# Patient Record
Sex: MALE | Race: OTHER | NOT HISPANIC OR LATINO | URBAN - METROPOLITAN AREA
[De-identification: names, ages, dates, MRNs, and addresses within clinical notes are randomized per-mention and may not be internally consistent; named-entity substitution may affect disease eponyms.]

---

## 2020-08-24 ENCOUNTER — EMERGENCY (EMERGENCY)
Facility: HOSPITAL | Age: 35
LOS: 0 days | Discharge: HOME | End: 2020-08-24
Attending: EMERGENCY MEDICINE
Payer: COMMERCIAL

## 2020-08-24 VITALS
DIASTOLIC BLOOD PRESSURE: 84 MMHG | TEMPERATURE: 98 F | RESPIRATION RATE: 18 BRPM | HEART RATE: 128 BPM | OXYGEN SATURATION: 96 % | SYSTOLIC BLOOD PRESSURE: 143 MMHG

## 2020-08-24 VITALS
HEART RATE: 146 BPM | WEIGHT: 180.78 LBS | RESPIRATION RATE: 18 BRPM | OXYGEN SATURATION: 96 % | HEIGHT: 66.93 IN | DIASTOLIC BLOOD PRESSURE: 71 MMHG | TEMPERATURE: 101 F | SYSTOLIC BLOOD PRESSURE: 110 MMHG

## 2020-08-24 DIAGNOSIS — J02.9 ACUTE PHARYNGITIS, UNSPECIFIED: ICD-10-CM

## 2020-08-24 DIAGNOSIS — R50.9 FEVER, UNSPECIFIED: ICD-10-CM

## 2020-08-24 DIAGNOSIS — R00.0 TACHYCARDIA, UNSPECIFIED: ICD-10-CM

## 2020-08-24 LAB — S PYO AG SPEC QL IA: POSITIVE

## 2020-08-24 PROCEDURE — 99283 EMERGENCY DEPT VISIT LOW MDM: CPT

## 2020-08-24 PROCEDURE — 87880 STREP A ASSAY W/OPTIC: CPT

## 2020-08-24 PROCEDURE — U0003: CPT

## 2020-08-24 NOTE — ED STATDOCS - OBJECTIVE STATEMENT
34 y/o male visiting from [place], with no PMHx, presents for COVID-19 testing. Pt not feeling well today. +sore throat. +fever of 100.7F. No sick contacts. Family wanted pt to get tested. Non smoker. No illegal drug use.

## 2020-08-24 NOTE — ED STATDOCS - CARDIAC, MLM
normal rate, regular rhythm, and no murmur. normal rate, regular rhythm, and no murmur.  Mild tachycardic

## 2020-08-24 NOTE — ED STATDOCS - CLINICAL SUMMARY MEDICAL DECISION MAKING FREE TEXT BOX
pt found to be tachy, offered pt ekg labs and IV hydration. Pt feels fine and refuses further testing. Discussed risks/ benefits. Only wants COVID test. Given fevers and redness of throat and several lymphadenopathy, will rx for strep test, does not want rapid strep in the ed today pt found to be tachy, offered pt ekg labs and IV hydration. Pt HR ~110 during exam. Pt feels fine and refuses further testing. Discussed risks/ benefits. Only wants COVID test. Given fevers and redness of throat and several lymphadenopathy, will rx for strep test, does not want rapid strep in the ed today

## 2020-08-24 NOTE — ED ADULT TRIAGE NOTE - CHIEF COMPLAINT QUOTE
Patient comes in with throat pain and fever. Patient pulled in from car swab line. Patient comes in with throat pain and fever. Patient pulled in from car swab line. Patient is coming from [facility].

## 2020-08-24 NOTE — ED ADULT NURSE NOTE - CHIEF COMPLAINT QUOTE
Patient comes in with throat pain and fever. Patient pulled in from car swab line. Patient is coming from [facility].

## 2020-08-24 NOTE — ED STATDOCS - NSFOLLOWUPINSTRUCTIONS_ED_ALL_ED_FT
Please call and follow up with your doctor in 1-3 days.  Use Tylenol (acetaminophen) 1000mg every 6 hours and Motrin (Ibuprofen) 600 mg every 6 hours as need for fever/pain.    please drink lots of fluid.    Pharyngitis    WHAT YOU NEED TO KNOW:    Pharyngitis, or sore throat, is inflammation of the tissues and structures in your pharynx (throat). Pharyngitis is most often caused by bacteria. It may also be caused by a cold or flu virus. Other causes include smoking, allergies, or acid reflux.     DISCHARGE INSTRUCTIONS:    Call 911 for any of the following:     You have trouble breathing or swallowing because your throat is swollen or sore.        Return to the emergency department if:     You are drooling because it hurts too much to swallow.      Your fever is higher than 102°F (39°C) or lasts longer than 3 days.      You are confused.      You taste blood in your throat.    Contact your healthcare provider if:     Your throat pain gets worse.      You have a painful lump in your throat that does not go away after 5 days.      Your symptoms do not improve after 5 days.      You have questions or concerns about your condition or care.    Medicines: Viral pharyngitis will go away on its own without treatment. Your sore throat should start to feel better in 3 to 5 days for both viral and bacterial infections. You may need any of the following:     Antibiotics treat a bacterial infection.      NSAIDs, such as ibuprofen, help decrease swelling, pain, and fever. NSAIDs can cause stomach bleeding or kidney problems in certain people. If you take blood thinner medicine, always ask your healthcare provider if NSAIDs are safe for you. Always read the medicine label and follow directions.      Acetaminophen decreases pain and fever. It is available without a doctor's order. Ask how much to take and how often to take it. Follow directions. Acetaminophen can cause liver damage if not taken correctly.      Take your medicine as directed. Contact your healthcare provider if you think your medicine is not helping or if you have side effects. Tell him or her if you are allergic to any medicine. Keep a list of the medicines, vitamins, and herbs you take. Include the amounts, and when and why you take them. Bring the list or the pill bottles to follow-up visits. Carry your medicine list with you in case of an emergency.    Manage your symptoms:     Gargle salt water. Mix ¼ teaspoon salt in an 8 ounce glass of warm water and gargle. This may help decrease swelling in your throat.      Drink liquids as directed. You may need to drink more liquids than usual. Liquids may help soothe your throat and prevent dehydration. Ask how much liquid to drink each day and which liquids are best for you.      Use a cool-steam humidifier to help moisten the air in your room and calm your cough.      Soothe your throat with cough drops, ice, soft foods, or popsicles.    Prevent the spread of pharyngitis: Cover your mouth and nose when you cough or sneeze. Do not share food or drinks. Wash your hands often. Use soap and water. If soap and water are unavailable, use an alcohol based hand .     Follow up with your healthcare provider as directed: Write down your questions so you remember to ask them during your visits.    Viral Respiratory Infection  A viral respiratory infection is an illness that affects parts of the body used for breathing, like the lungs, nose, and throat. It is caused by a germ called a virus.    ImageSome examples of this kind of infection are:    A cold.  The flu (influenza).  A respiratory syncytial virus (RSV) infection.    How do I know if I have this infection?  Most of the time this infection causes:    A stuffy or runny nose.  Yellow or green fluid in the nose.  A cough.  Sneezing.  Tiredness (fatigue).  Achy muscles.  A sore throat.  Sweating or chills.  A fever.  A headache.    How is this infection treated?  If the flu is diagnosed early, it may be treated with an antiviral medicine. This medicine shortens the length of time a person has symptoms. Symptoms may be treated with over-the-counter and prescription medicines, such as:    Expectorants. These make it easier to cough up mucus.  Decongestant nasal sprays.    Doctors do not prescribe antibiotic medicines for viral infections. They do not work with this kind of infection.    How do I know if I should stay home?  To keep others from getting sick, stay home if you have:    A fever.  A lasting cough.  A sore throat.  A runny nose.  Sneezing.  Muscles aches.  Headaches.  Tiredness.  Weakness.  Chills.  Sweating.  An upset stomach (nausea).    Follow these instructions at home:  Rest as much as possible.  Take over-the-counter and prescription medicines only as told by your doctor.  Drink enough fluid to keep your pee (urine) clear or pale yellow.  Gargle with salt water. Do this 3–4 times per day or as needed. To make a salt–water mixture, dissolve ½–1 tsp of salt in 1 cup of warm water. Make sure the salt dissolves all the way.  Use nose drops made from salt water. This helps with stuffiness (congestion). It also helps soften the skin around your nose.  Do not drink alcohol.  Do not use tobacco products, including cigarettes, chewing tobacco, and e-cigarettes. If you need help quitting, ask your doctor.  Get help if:  Your symptoms last for 10 days or longer.  Your symptoms get worse over time.  You have a fever.  You have very bad pain in your face or forehead.  Parts of your jaw or neck become very swollen.  Get help right away if:  You feel pain or pressure in your chest.  You have shortness of breath.  You faint or feel like you will faint.  You keep throwing up (vomiting).  You feel confused.  This information is not intended to replace advice given to you by your health care provider. Make sure you discuss any questions you have with your health care provider.

## 2020-08-24 NOTE — ED STATDOCS - PATIENT PORTAL LINK FT
You can access the FollowMyHealth Patient Portal offered by [facility] by registering at the following website: [email or web address]. By joining [employer]’s FollowMyHealth portal, you will also be able to view your health information using other applications (apps) compatible with our system.

## 2020-08-25 LAB — SARS-COV-2 RNA SPEC QL NAA+PROBE: SIGNIFICANT CHANGE UP

## 2020-08-25 NOTE — ED POST DISCHARGE NOTE - RESULT SUMMARY
+strep, patient states he is on amoxil 500mg TID x7 days already.  USed number from COVID order to call back at [phone] -Angela Webb PA-C

## 2024-03-05 PROBLEM — Z78.9 OTHER SPECIFIED HEALTH STATUS: Chronic | Status: ACTIVE | Noted: 2020-08-24

## 2024-03-13 PROBLEM — Z00.00 ENCOUNTER FOR PREVENTIVE HEALTH EXAMINATION: Status: ACTIVE | Noted: 2024-03-13

## 2024-03-19 ENCOUNTER — APPOINTMENT (OUTPATIENT)
Dept: UROLOGY | Facility: CLINIC | Age: 39
End: 2024-03-19
Payer: COMMERCIAL

## 2024-03-19 VITALS
BODY MASS INDEX: 28.37 KG/M2 | DIASTOLIC BLOOD PRESSURE: 78 MMHG | HEART RATE: 71 BPM | HEIGHT: 66.93 IN | OXYGEN SATURATION: 96 % | WEIGHT: 180.78 LBS | SYSTOLIC BLOOD PRESSURE: 129 MMHG

## 2024-03-19 DIAGNOSIS — Z78.9 OTHER SPECIFIED HEALTH STATUS: ICD-10-CM

## 2024-03-19 DIAGNOSIS — Z30.09 ENCOUNTER FOR OTHER GENERAL COUNSELING AND ADVICE ON CONTRACEPTION: ICD-10-CM

## 2024-03-19 PROCEDURE — 99203 OFFICE O/P NEW LOW 30 MIN: CPT

## 2024-03-19 NOTE — HISTORY OF PRESENT ILLNESS
[FreeTextEntry1] : 38-year-old male presents for vasectomy evaluation. Patient mentions that him and his wife: 32-year-old have 2 children, youngest 4 years old.  They have explored other options of contraception and have decided for him to undergo Vasectomy. Denies any difficulty urinating.  Denies dysuria, hematuria, flank pain, fever, chills or rigors. Normal erections and libido.  No Family history of Prostate cancer.

## 2024-03-19 NOTE — ASSESSMENT
[FreeTextEntry1] : Vasectomy: Extensively discussed the procedure and reviewed the likely postoperative course.  He understands the potential side effects of anesthesia, bleeding, scrotal hematoma, wound infection, epididymal orchitis, epididymal congestion, chronic testicular pain requiring further surgery, sperm granuloma, antisperm antibodies, early recanalization, spontaneous recanalization with pregnancy after demonstration of azoospermia and the possible association with prostate cancer.  He is aware of the recommendation for PSA and JESSICA yearly after the age of 50.  He will need to have a semen analysis after 6-8 weeks. Discussed the need to continue to use contraception until he is notified by us of his sterility.   Recommended to undergo the procedure under general anesthesia.  Patient agreeable. Will schedule next available at [facility].

## 2024-03-19 NOTE — PHYSICAL EXAM
[Urethral Meatus] : meatus normal [Scrotum] : the scrotum was normal [Epididymis] : the epididymides were normal [Testes Mass (___cm)] : there were no testicular masses [Testes Tenderness] : no tenderness of the testes [de-identified] : contracted scrotum

## 2024-05-02 ENCOUNTER — APPOINTMENT (OUTPATIENT)
Dept: UROLOGY | Facility: CLINIC | Age: 39
End: 2024-05-02

## 2024-05-10 ENCOUNTER — OUTPATIENT (OUTPATIENT)
Dept: OUTPATIENT SERVICES | Facility: HOSPITAL | Age: 39
LOS: 1 days | End: 2024-05-10
Payer: COMMERCIAL

## 2024-05-10 VITALS
DIASTOLIC BLOOD PRESSURE: 73 MMHG | SYSTOLIC BLOOD PRESSURE: 115 MMHG | RESPIRATION RATE: 16 BRPM | TEMPERATURE: 98 F | WEIGHT: 179.02 LBS | HEIGHT: 66 IN | HEART RATE: 77 BPM | OXYGEN SATURATION: 99 %

## 2024-05-10 DIAGNOSIS — Z98.890 OTHER SPECIFIED POSTPROCEDURAL STATES: Chronic | ICD-10-CM

## 2024-05-10 DIAGNOSIS — Z01.818 ENCOUNTER FOR OTHER PREPROCEDURAL EXAMINATION: ICD-10-CM

## 2024-05-10 LAB
ABO RH CONFIRMATION: SIGNIFICANT CHANGE UP
ANION GAP SERPL CALC-SCNC: 2 MMOL/L — LOW (ref 5–17)
APPEARANCE UR: CLEAR — SIGNIFICANT CHANGE UP
APTT BLD: 30.5 SEC — SIGNIFICANT CHANGE UP (ref 24.5–35.6)
BACTERIA # UR AUTO: NEGATIVE /HPF — SIGNIFICANT CHANGE UP
BASOPHILS # BLD AUTO: 0.05 K/UL — SIGNIFICANT CHANGE UP (ref 0–0.2)
BASOPHILS NFR BLD AUTO: 0.7 % — SIGNIFICANT CHANGE UP (ref 0–2)
BILIRUB UR-MCNC: NEGATIVE — SIGNIFICANT CHANGE UP
BLD GP AB SCN SERPL QL: SIGNIFICANT CHANGE UP
BUN SERPL-MCNC: 24 MG/DL — HIGH (ref 7–23)
CALCIUM SERPL-MCNC: 8.7 MG/DL — SIGNIFICANT CHANGE UP (ref 8.5–10.1)
CAST: 1 /LPF — SIGNIFICANT CHANGE UP (ref 0–4)
CHLORIDE SERPL-SCNC: 108 MMOL/L — SIGNIFICANT CHANGE UP (ref 96–108)
CO2 SERPL-SCNC: 28 MMOL/L — SIGNIFICANT CHANGE UP (ref 22–31)
COLOR SPEC: YELLOW — SIGNIFICANT CHANGE UP
CREAT SERPL-MCNC: 1.16 MG/DL — SIGNIFICANT CHANGE UP (ref 0.5–1.3)
DIFF PNL FLD: NEGATIVE — SIGNIFICANT CHANGE UP
EGFR: 82 ML/MIN/1.73M2 — SIGNIFICANT CHANGE UP
EOSINOPHIL # BLD AUTO: 0.22 K/UL — SIGNIFICANT CHANGE UP (ref 0–0.5)
EOSINOPHIL NFR BLD AUTO: 3.1 % — SIGNIFICANT CHANGE UP (ref 0–6)
GLUCOSE SERPL-MCNC: 108 MG/DL — HIGH (ref 70–99)
GLUCOSE UR QL: NEGATIVE MG/DL — SIGNIFICANT CHANGE UP
HCT VFR BLD CALC: 40.7 % — SIGNIFICANT CHANGE UP (ref 39–50)
HGB BLD-MCNC: 13.6 G/DL — SIGNIFICANT CHANGE UP (ref 13–17)
IMM GRANULOCYTES NFR BLD AUTO: 0.6 % — SIGNIFICANT CHANGE UP (ref 0–0.9)
INR BLD: 0.94 RATIO — SIGNIFICANT CHANGE UP (ref 0.85–1.18)
KETONES UR-MCNC: NEGATIVE MG/DL — SIGNIFICANT CHANGE UP
LEUKOCYTE ESTERASE UR-ACNC: ABNORMAL
LYMPHOCYTES # BLD AUTO: 1.82 K/UL — SIGNIFICANT CHANGE UP (ref 1–3.3)
LYMPHOCYTES # BLD AUTO: 25.3 % — SIGNIFICANT CHANGE UP (ref 13–44)
MCHC RBC-ENTMCNC: 30 PG — SIGNIFICANT CHANGE UP (ref 27–34)
MCHC RBC-ENTMCNC: 33.4 GM/DL — SIGNIFICANT CHANGE UP (ref 32–36)
MCV RBC AUTO: 89.6 FL — SIGNIFICANT CHANGE UP (ref 80–100)
MONOCYTES # BLD AUTO: 0.45 K/UL — SIGNIFICANT CHANGE UP (ref 0–0.9)
MONOCYTES NFR BLD AUTO: 6.3 % — SIGNIFICANT CHANGE UP (ref 2–14)
NEUTROPHILS # BLD AUTO: 4.6 K/UL — SIGNIFICANT CHANGE UP (ref 1.8–7.4)
NEUTROPHILS NFR BLD AUTO: 64 % — SIGNIFICANT CHANGE UP (ref 43–77)
NITRITE UR-MCNC: NEGATIVE — SIGNIFICANT CHANGE UP
PH UR: 5.5 — SIGNIFICANT CHANGE UP (ref 5–8)
PLATELET # BLD AUTO: 237 K/UL — SIGNIFICANT CHANGE UP (ref 150–400)
POTASSIUM SERPL-MCNC: 3.8 MMOL/L — SIGNIFICANT CHANGE UP (ref 3.5–5.3)
POTASSIUM SERPL-SCNC: 3.8 MMOL/L — SIGNIFICANT CHANGE UP (ref 3.5–5.3)
PROT UR-MCNC: NEGATIVE MG/DL — SIGNIFICANT CHANGE UP
PROTHROM AB SERPL-ACNC: 10.6 SEC — SIGNIFICANT CHANGE UP (ref 9.5–13)
RBC # BLD: 4.54 M/UL — SIGNIFICANT CHANGE UP (ref 4.2–5.8)
RBC # FLD: 12 % — SIGNIFICANT CHANGE UP (ref 10.3–14.5)
RBC CASTS # UR COMP ASSIST: 0 /HPF — SIGNIFICANT CHANGE UP (ref 0–4)
SODIUM SERPL-SCNC: 138 MMOL/L — SIGNIFICANT CHANGE UP (ref 135–145)
SP GR SPEC: 1.03 — SIGNIFICANT CHANGE UP (ref 1–1.03)
SQUAMOUS # UR AUTO: 0 /HPF — SIGNIFICANT CHANGE UP (ref 0–5)
UROBILINOGEN FLD QL: 0.2 MG/DL — SIGNIFICANT CHANGE UP (ref 0.2–1)
WBC # BLD: 7.18 K/UL — SIGNIFICANT CHANGE UP (ref 3.8–10.5)
WBC # FLD AUTO: 7.18 K/UL — SIGNIFICANT CHANGE UP (ref 3.8–10.5)
WBC UR QL: 1 /HPF — SIGNIFICANT CHANGE UP (ref 0–5)

## 2024-05-10 PROCEDURE — 85730 THROMBOPLASTIN TIME PARTIAL: CPT

## 2024-05-10 PROCEDURE — 36415 COLL VENOUS BLD VENIPUNCTURE: CPT

## 2024-05-10 PROCEDURE — 93005 ELECTROCARDIOGRAM TRACING: CPT

## 2024-05-10 PROCEDURE — 86901 BLOOD TYPING SEROLOGIC RH(D): CPT

## 2024-05-10 PROCEDURE — 81001 URINALYSIS AUTO W/SCOPE: CPT

## 2024-05-10 PROCEDURE — 85610 PROTHROMBIN TIME: CPT

## 2024-05-10 PROCEDURE — 86850 RBC ANTIBODY SCREEN: CPT

## 2024-05-10 PROCEDURE — 86900 BLOOD TYPING SEROLOGIC ABO: CPT

## 2024-05-10 PROCEDURE — 87086 URINE CULTURE/COLONY COUNT: CPT

## 2024-05-10 PROCEDURE — 99214 OFFICE O/P EST MOD 30 MIN: CPT | Mod: 25

## 2024-05-10 PROCEDURE — 93010 ELECTROCARDIOGRAM REPORT: CPT

## 2024-05-10 PROCEDURE — 85025 COMPLETE CBC W/AUTO DIFF WBC: CPT

## 2024-05-10 PROCEDURE — 80048 BASIC METABOLIC PNL TOTAL CA: CPT

## 2024-05-10 NOTE — H&P PST ADULT - ASSESSMENT
38 year old male with desire for sterilization; he presents to PST for planned bilateral vasectomy       Plan:  1. PST instructions given ; NPO status/  instructions to be given by ASU   2. Pt instructed to take following meds on day of surgery: none   3. Pt instructed to take routine evening medications unless indicated   4. Stop NSAIDS ( Aspirin Alev Motrin Mobic Diclofenac), herbal supplements , MVI , Vitamin fish oil 7 days prior to surgery  unless   directed by surgeon or cardiologist;   5. Medical Optimization not indicated   6. Pt denies covid symptoms shortness of breath fever cough   7. Labs EKG  as per surgeon request

## 2024-05-11 DIAGNOSIS — Z01.818 ENCOUNTER FOR OTHER PREPROCEDURAL EXAMINATION: ICD-10-CM

## 2024-05-11 LAB
CULTURE RESULTS: NO GROWTH — SIGNIFICANT CHANGE UP
SPECIMEN SOURCE: SIGNIFICANT CHANGE UP

## 2024-05-20 ENCOUNTER — APPOINTMENT (OUTPATIENT)
Dept: UROLOGY | Facility: HOSPITAL | Age: 39
End: 2024-05-20

## 2024-05-20 ENCOUNTER — TRANSCRIPTION ENCOUNTER (OUTPATIENT)
Age: 39
End: 2024-05-20

## 2024-05-20 ENCOUNTER — RESULT REVIEW (OUTPATIENT)
Age: 39
End: 2024-05-20

## 2024-05-20 ENCOUNTER — OUTPATIENT (OUTPATIENT)
Dept: INPATIENT UNIT | Facility: HOSPITAL | Age: 39
LOS: 1 days | Discharge: HOME | End: 2024-05-20
Payer: COMMERCIAL

## 2024-05-20 VITALS
HEART RATE: 79 BPM | OXYGEN SATURATION: 100 % | RESPIRATION RATE: 18 BRPM | SYSTOLIC BLOOD PRESSURE: 109 MMHG | DIASTOLIC BLOOD PRESSURE: 65 MMHG | TEMPERATURE: 98 F

## 2024-05-20 VITALS
HEART RATE: 78 BPM | TEMPERATURE: 98 F | OXYGEN SATURATION: 100 % | RESPIRATION RATE: 16 BRPM | HEIGHT: 66.93 IN | DIASTOLIC BLOOD PRESSURE: 74 MMHG | SYSTOLIC BLOOD PRESSURE: 114 MMHG | WEIGHT: 176.37 LBS

## 2024-05-20 DIAGNOSIS — Z30.09 ENCOUNTER FOR OTHER GENERAL COUNSELING AND ADVICE ON CONTRACEPTION: ICD-10-CM

## 2024-05-20 DIAGNOSIS — Z98.890 OTHER SPECIFIED POSTPROCEDURAL STATES: Chronic | ICD-10-CM

## 2024-05-20 PROCEDURE — 55250 REMOVAL OF SPERM DUCT(S): CPT

## 2024-05-20 PROCEDURE — 88302 TISSUE EXAM BY PATHOLOGIST: CPT | Mod: 26

## 2024-05-20 PROCEDURE — 88302 TISSUE EXAM BY PATHOLOGIST: CPT

## 2024-05-20 RX ORDER — IBUPROFEN 200 MG
1 TABLET ORAL
Qty: 15 | Refills: 1
Start: 2024-05-20 | End: 2024-05-29

## 2024-05-20 RX ORDER — SODIUM CHLORIDE 9 MG/ML
1000 INJECTION, SOLUTION INTRAVENOUS
Refills: 0 | Status: DISCONTINUED | OUTPATIENT
Start: 2024-05-20 | End: 2024-05-20

## 2024-05-20 RX ORDER — ONDANSETRON 8 MG/1
4 TABLET, FILM COATED ORAL ONCE
Refills: 0 | Status: DISCONTINUED | OUTPATIENT
Start: 2024-05-20 | End: 2024-05-20

## 2024-05-20 RX ORDER — FENTANYL CITRATE 50 UG/ML
25 INJECTION INTRAVENOUS
Refills: 0 | Status: DISCONTINUED | OUTPATIENT
Start: 2024-05-20 | End: 2024-05-20

## 2024-05-20 RX ORDER — OXYCODONE AND ACETAMINOPHEN 5; 325 MG/1; MG/1
1 TABLET ORAL
Qty: 16 | Refills: 0
Start: 2024-05-20 | End: 2024-05-23

## 2024-05-20 RX ORDER — OXYCODONE HYDROCHLORIDE 5 MG/1
5 TABLET ORAL ONCE
Refills: 0 | Status: DISCONTINUED | OUTPATIENT
Start: 2024-05-20 | End: 2024-05-20

## 2024-05-20 NOTE — ASU DISCHARGE PLAN (ADULT/PEDIATRIC) - CARE PROVIDER_API CALL
Seb Davis  [address] [place] [address]  [address] [place] [address]    Tel: [phone]  Phone: (   )    -  Fax: (   )    -  Scheduled Appointment: 06/05/2024 02:20 PM

## 2024-05-20 NOTE — ASU PATIENT PROFILE, ADULT - FALL HARM RISK - UNIVERSAL INTERVENTIONS
Bed in lowest position, wheels locked, appropriate side rails in place/Call bell, personal items and telephone in reach/Instruct patient to call for assistance before getting out of bed or chair/Non-slip footwear when patient is out of bed/[place] to call system/Physically safe environment - no spills, clutter or unnecessary equipment/Purposeful Proactive Rounding

## 2024-05-20 NOTE — ASU PATIENT PROFILE, ADULT - PRO INTERPRETER NEED 2
PT C/O INTERMITTENT RIGHT UPPER CHEST PAIN. MADE DR GARCIA AWARE AND SHE ORDERED NITROSTAT 
FOR PT. ADMINISTERED NITROSTAT AND PT TOLERATED WELL. English

## 2024-05-20 NOTE — ASU DISCHARGE PLAN (ADULT/PEDIATRIC) - PROVIDER TOKENS
FREE:[LAST:[Susan],FIRST:[Seb],PHONE:[(   )    -],FAX:[(   )    -],ADDRESS:[[address] [place] [address]    Tel: [phone]],SCHEDULEDAPPT:[06/05/2024],SCHEDULEDAPPTTIME:[02:20 PM]]

## 2024-05-20 NOTE — ASU DISCHARGE PLAN (ADULT/PEDIATRIC) - CALL YOUR DOCTOR IF YOU HAVE ANY OF THE FOLLOWING:
Bleeding that does not stop/Swelling that gets worse/Fever greater than (need to indicate Fahrenheit or Celsius)/Wound/Surgical Site with redness, or foul smelling discharge or pus/Nausea and vomiting that does not stop/Inability to tolerate liquids or foods/Increased irritability or sluggishness

## 2024-05-20 NOTE — ASU DISCHARGE PLAN (ADULT/PEDIATRIC) - NS MD DC FALL RISK RISK
For information on Fall & Injury Prevention, visit: [email or web address] OR  [email or web address] OR  https://www.cdc.gov/steadi/patient.html

## 2024-05-22 LAB — SURGICAL PATHOLOGY STUDY: SIGNIFICANT CHANGE UP

## 2024-05-26 DIAGNOSIS — Z30.2 ENCOUNTER FOR STERILIZATION: ICD-10-CM

## 2024-06-05 ENCOUNTER — APPOINTMENT (OUTPATIENT)
Dept: UROLOGY | Facility: CLINIC | Age: 39
End: 2024-06-05
Payer: COMMERCIAL

## 2024-06-05 DIAGNOSIS — Z98.52 VASECTOMY STATUS: ICD-10-CM

## 2024-06-05 PROCEDURE — 99024 POSTOP FOLLOW-UP VISIT: CPT

## 2024-06-10 NOTE — ADDENDUM
[FreeTextEntry1] :  Anabella RAMOS assisted in documentation on 06/05/2024  acting as a scribe for Dr. Seb Davis.

## 2024-06-10 NOTE — ASSESSMENT
[FreeTextEntry1] : Reviewed records. Discussed labs.  Status post vasectomy: Healing well. Discussed Pathology.  Asked Pt to continue to use protection until negative semen analysis 8 weeks from procedure.  Asked Pt to call us once done, will call with results.

## 2024-06-10 NOTE — HISTORY OF PRESENT ILLNESS
[FreeTextEntry1] : 06/05/2024: 38-year-old male presents for follow-up.  Did have some pain/discomfort 1-2 days after surgery. None since then.  Did not require too much pain medication.   Status post bilateral vasectomy on 05/20/2024 at [facility].   Seen on 3/19/2024 for vasectomy evaluation. Mention that him and his wife: 32-year-old have 2 children, youngest 4 years old.  They had explored other options of contraception and have decided for him to undergo Vasectomy. Denied any difficulty urinating.  Denied dysuria, hematuria, flank pain, fever, chills or rigors. Normal erections and libido.  No Family history of Prostate cancer.

## 2024-06-10 NOTE — PHYSICAL EXAM
[Urethral Meatus] : meatus normal [Scrotum] : the scrotum was normal [Epididymis] : the epididymides were normal [Testes Tenderness] : no tenderness of the testes [Testes Mass (___cm)] : there were no testicular masses [de-identified] : Incision was well healed. Bilateral slight induration along the spermatic cord, non-tender

## 2024-06-17 ENCOUNTER — EMERGENCY (EMERGENCY)
Facility: HOSPITAL | Age: 39
LOS: 0 days | Discharge: HOME | End: 2024-06-17
Attending: HOSPITALIST
Payer: COMMERCIAL

## 2024-06-17 VITALS
DIASTOLIC BLOOD PRESSURE: 68 MMHG | TEMPERATURE: 98 F | SYSTOLIC BLOOD PRESSURE: 115 MMHG | RESPIRATION RATE: 17 BRPM | OXYGEN SATURATION: 98 % | HEART RATE: 65 BPM

## 2024-06-17 VITALS — HEIGHT: 66 IN

## 2024-06-17 DIAGNOSIS — N20.0 CALCULUS OF KIDNEY: ICD-10-CM

## 2024-06-17 DIAGNOSIS — R10.9 UNSPECIFIED ABDOMINAL PAIN: ICD-10-CM

## 2024-06-17 LAB
ALBUMIN SERPL ELPH-MCNC: 3.7 G/DL — SIGNIFICANT CHANGE UP (ref 3.3–5)
ALP SERPL-CCNC: 56 U/L — SIGNIFICANT CHANGE UP (ref 40–120)
ALT FLD-CCNC: 31 U/L — SIGNIFICANT CHANGE UP (ref 12–78)
ANION GAP SERPL CALC-SCNC: 5 MMOL/L — SIGNIFICANT CHANGE UP (ref 5–17)
APPEARANCE UR: CLEAR — SIGNIFICANT CHANGE UP
AST SERPL-CCNC: 23 U/L — SIGNIFICANT CHANGE UP (ref 15–37)
BACTERIA # UR AUTO: NEGATIVE /HPF — SIGNIFICANT CHANGE UP
BASOPHILS # BLD AUTO: 0.04 K/UL — SIGNIFICANT CHANGE UP (ref 0–0.2)
BASOPHILS NFR BLD AUTO: 0.5 % — SIGNIFICANT CHANGE UP (ref 0–2)
BILIRUB SERPL-MCNC: 0.8 MG/DL — SIGNIFICANT CHANGE UP (ref 0.2–1.2)
BILIRUB UR-MCNC: NEGATIVE — SIGNIFICANT CHANGE UP
BUN SERPL-MCNC: 20 MG/DL — SIGNIFICANT CHANGE UP (ref 7–23)
CALCIUM SERPL-MCNC: 9 MG/DL — SIGNIFICANT CHANGE UP (ref 8.5–10.1)
CAST: 1 /LPF — SIGNIFICANT CHANGE UP (ref 0–4)
CHLORIDE SERPL-SCNC: 109 MMOL/L — HIGH (ref 96–108)
CO2 SERPL-SCNC: 26 MMOL/L — SIGNIFICANT CHANGE UP (ref 22–31)
COLOR SPEC: YELLOW — SIGNIFICANT CHANGE UP
CREAT SERPL-MCNC: 1.06 MG/DL — SIGNIFICANT CHANGE UP (ref 0.5–1.3)
DIFF PNL FLD: ABNORMAL
EGFR: 90 ML/MIN/1.73M2 — SIGNIFICANT CHANGE UP
EOSINOPHIL # BLD AUTO: 0.19 K/UL — SIGNIFICANT CHANGE UP (ref 0–0.5)
EOSINOPHIL NFR BLD AUTO: 2.6 % — SIGNIFICANT CHANGE UP (ref 0–6)
GLUCOSE SERPL-MCNC: 102 MG/DL — HIGH (ref 70–99)
GLUCOSE UR QL: NEGATIVE MG/DL — SIGNIFICANT CHANGE UP
HCT VFR BLD CALC: 37.5 % — LOW (ref 39–50)
HGB BLD-MCNC: 12.9 G/DL — LOW (ref 13–17)
IMM GRANULOCYTES NFR BLD AUTO: 0.3 % — SIGNIFICANT CHANGE UP (ref 0–0.9)
KETONES UR-MCNC: NEGATIVE MG/DL — SIGNIFICANT CHANGE UP
LEUKOCYTE ESTERASE UR-ACNC: NEGATIVE — SIGNIFICANT CHANGE UP
LIDOCAIN IGE QN: 26 U/L — SIGNIFICANT CHANGE UP (ref 13–75)
LYMPHOCYTES # BLD AUTO: 2.92 K/UL — SIGNIFICANT CHANGE UP (ref 1–3.3)
LYMPHOCYTES # BLD AUTO: 39.2 % — SIGNIFICANT CHANGE UP (ref 13–44)
MCHC RBC-ENTMCNC: 30.8 PG — SIGNIFICANT CHANGE UP (ref 27–34)
MCHC RBC-ENTMCNC: 34.4 GM/DL — SIGNIFICANT CHANGE UP (ref 32–36)
MCV RBC AUTO: 89.5 FL — SIGNIFICANT CHANGE UP (ref 80–100)
MONOCYTES # BLD AUTO: 0.76 K/UL — SIGNIFICANT CHANGE UP (ref 0–0.9)
MONOCYTES NFR BLD AUTO: 10.2 % — SIGNIFICANT CHANGE UP (ref 2–14)
NEUTROPHILS # BLD AUTO: 3.52 K/UL — SIGNIFICANT CHANGE UP (ref 1.8–7.4)
NEUTROPHILS NFR BLD AUTO: 47.2 % — SIGNIFICANT CHANGE UP (ref 43–77)
NITRITE UR-MCNC: NEGATIVE — SIGNIFICANT CHANGE UP
PH UR: 5 — SIGNIFICANT CHANGE UP (ref 5–8)
PLATELET # BLD AUTO: 241 K/UL — SIGNIFICANT CHANGE UP (ref 150–400)
POTASSIUM SERPL-MCNC: 3.5 MMOL/L — SIGNIFICANT CHANGE UP (ref 3.5–5.3)
POTASSIUM SERPL-SCNC: 3.5 MMOL/L — SIGNIFICANT CHANGE UP (ref 3.5–5.3)
PROT SERPL-MCNC: 7.3 GM/DL — SIGNIFICANT CHANGE UP (ref 6–8.3)
PROT UR-MCNC: NEGATIVE MG/DL — SIGNIFICANT CHANGE UP
RBC # BLD: 4.19 M/UL — LOW (ref 4.2–5.8)
RBC # FLD: 12.4 % — SIGNIFICANT CHANGE UP (ref 10.3–14.5)
RBC CASTS # UR COMP ASSIST: 8 /HPF — HIGH (ref 0–4)
SODIUM SERPL-SCNC: 140 MMOL/L — SIGNIFICANT CHANGE UP (ref 135–145)
SP GR SPEC: 1.02 — SIGNIFICANT CHANGE UP (ref 1–1.03)
SQUAMOUS # UR AUTO: 1 /HPF — SIGNIFICANT CHANGE UP (ref 0–5)
UROBILINOGEN FLD QL: 0.2 MG/DL — SIGNIFICANT CHANGE UP (ref 0.2–1)
WBC # BLD: 7.45 K/UL — SIGNIFICANT CHANGE UP (ref 3.8–10.5)
WBC # FLD AUTO: 7.45 K/UL — SIGNIFICANT CHANGE UP (ref 3.8–10.5)
WBC UR QL: 0 /HPF — SIGNIFICANT CHANGE UP (ref 0–5)

## 2024-06-17 PROCEDURE — 96374 THER/PROPH/DIAG INJ IV PUSH: CPT

## 2024-06-17 PROCEDURE — 81001 URINALYSIS AUTO W/SCOPE: CPT

## 2024-06-17 PROCEDURE — 36415 COLL VENOUS BLD VENIPUNCTURE: CPT

## 2024-06-17 PROCEDURE — 83690 ASSAY OF LIPASE: CPT

## 2024-06-17 PROCEDURE — 74176 CT ABD & PELVIS W/O CONTRAST: CPT | Mod: 26,MC

## 2024-06-17 PROCEDURE — 93010 ELECTROCARDIOGRAM REPORT: CPT

## 2024-06-17 PROCEDURE — 80053 COMPREHEN METABOLIC PANEL: CPT

## 2024-06-17 PROCEDURE — 74176 CT ABD & PELVIS W/O CONTRAST: CPT | Mod: MC

## 2024-06-17 PROCEDURE — 93005 ELECTROCARDIOGRAM TRACING: CPT

## 2024-06-17 PROCEDURE — 99284 EMERGENCY DEPT VISIT MOD MDM: CPT

## 2024-06-17 PROCEDURE — 85025 COMPLETE CBC W/AUTO DIFF WBC: CPT

## 2024-06-17 PROCEDURE — 99285 EMERGENCY DEPT VISIT HI MDM: CPT | Mod: 25

## 2024-06-17 RX ORDER — OXYCODONE HYDROCHLORIDE 5 MG/1
1 TABLET ORAL
Qty: 12 | Refills: 0
Start: 2024-06-17 | End: 2024-06-20

## 2024-06-17 RX ORDER — SODIUM CHLORIDE 9 MG/ML
1000 INJECTION INTRAMUSCULAR; INTRAVENOUS; SUBCUTANEOUS ONCE
Refills: 0 | Status: COMPLETED | OUTPATIENT
Start: 2024-06-17 | End: 2024-06-17

## 2024-06-17 RX ORDER — KETOROLAC TROMETHAMINE 30 MG/ML
30 SYRINGE (ML) INJECTION ONCE
Refills: 0 | Status: DISCONTINUED | OUTPATIENT
Start: 2024-06-17 | End: 2024-06-17

## 2024-06-17 RX ORDER — TAMSULOSIN HYDROCHLORIDE 0.4 MG/1
1 CAPSULE ORAL
Qty: 5 | Refills: 0
Start: 2024-06-17 | End: 2024-06-21

## 2024-06-17 RX ADMIN — Medication 30 MILLIGRAM(S): at 02:16

## 2024-06-17 RX ADMIN — SODIUM CHLORIDE 1000 MILLILITER(S): 9 INJECTION INTRAMUSCULAR; INTRAVENOUS; SUBCUTANEOUS at 02:16

## 2024-06-17 NOTE — ED PROVIDER NOTE - OBJECTIVE STATEMENT
Date last seen: 07/31/2023  Date of next visit: 01/31/2024    Medication Requested:     Outpatient Current Medications as of as of 8/16/2023       Disp Refills Start End    zolpidem (AMBIEN) 10 MG tablet 30 tablet 2 5/17/2023     Sig - Route: Take 0.5-1 tablets by mouth nightly as needed for Sleep. - Oral    Class: Eprescribe          BP Readings from Last 1 Encounters:   07/31/23 134/80        39-year-old male with no past medical history presents with right flank pain.  Patient states symptoms have been present for the past hour or so.  Pain recently just stopped on its own.  No urinary symptoms.  No fever.  No similar symptoms in the past.

## 2024-06-17 NOTE — ED PROVIDER NOTE - PATIENT PORTAL LINK FT
You can access the FollowMyHealth Patient Portal offered by [facility] by registering at the following website: [email or web address]. By joining [employer]’s FollowMyHealth portal, you will also be able to view your health information using other applications (apps) compatible with our system.

## 2024-06-17 NOTE — ED ADULT TRIAGE NOTE - CHIEF COMPLAINT QUOTE
Pt presents to ED c/o right sided abdominal pain starting x30 mins ago after waking up. Pt reports sharp pain that is constant. Endorses nausea. Denies vomiting/diarrhea. Pt bending over in pain in triage. No meds taken PTA, no pertinent PMHx.

## 2024-06-17 NOTE — ED PROVIDER NOTE - PHYSICAL EXAMINATION
***GEN - NAD; well appearing; A+O x3 ***HEAD - NC/AT ***EYES/NOSE - PERRL, EOMI, mucous membranes moist, no discharge ***THROAT: Oral cavity and pharynx normal. No inflammation, swelling, exudate, or lesions.  ***NECK: Neck supple, non-tender   ***PULMONARY - CTA b/l, symmetric breath sounds. ***CARDIAC -s1s2, RRR, no M,G,R  ***ABDOMEN - +BS, ND, +ttp over right flank, soft, + guarding  ***BACK - no CVA tenderness, Normal  spine ***EXTREMITIES - symmetric pulses, 2+ dp, capillary refill < 2 seconds ***SKIN - no rash or bruising   ***NEUROLOGIC - alert, CN 2-12 intact

## 2024-06-17 NOTE — ED PROVIDER NOTE - CARE PROVIDER_API CALL
Baldemar Reyes  Urology  [address] [place] [id number]  Phone: [phone]  Fax: [phone]  Follow Up Time: 4-6 Days

## 2024-06-17 NOTE — ED ADULT NURSE NOTE - OBJECTIVE STATEMENT
pt c/o right abd/flank pain. pt states pain started about 30 mins PTA accompanied with nausea. Pt denies urinary symptoms, vomiting/diarrhea, fevers, SOB, CP, HA/dizziness at this time. No pertinent PMH.

## 2024-06-17 NOTE — ED PROVIDER NOTE - NSFOLLOWUPINSTRUCTIONS_ED_ALL_ED_FT
Take tylenol as needed for pain, 650Mg every 6-8 hours.  You can also take ibuprofen as needed for pain, 600mg every 6-8 hours, take with food.   please take oxycodone only as needed for severe pain.  Caution this medication can make you drowsy.  Please  strain your urine with the provided strainer.  Please take Flomax as prescribed until you passed the stone.  Please follow-up with a urologist, referral provided

## 2024-06-20 NOTE — BRIEF OPERATIVE NOTE - OPERATION/FINDINGS
Prolia injection discharge instructions:    Side effects: headache, joint pain, rash, swelling    Next Prolia injection on: December 23rd at 11 AM    Please call [phone] with a any questions or concerns.   
Normal bilateral vas deferens

## 2024-06-26 ENCOUNTER — APPOINTMENT (OUTPATIENT)
Dept: UROLOGY | Facility: CLINIC | Age: 39
End: 2024-06-26